# Patient Record
Sex: FEMALE | Race: WHITE | NOT HISPANIC OR LATINO | Employment: OTHER | URBAN - METROPOLITAN AREA
[De-identification: names, ages, dates, MRNs, and addresses within clinical notes are randomized per-mention and may not be internally consistent; named-entity substitution may affect disease eponyms.]

---

## 2021-11-05 ENCOUNTER — TELEPHONE (OUTPATIENT)
Dept: INTERNAL MEDICINE | Age: 66
End: 2021-11-05

## 2021-11-05 NOTE — TELEPHONE ENCOUNTER
Caller: Kim Phillips    Relationship to patient: Self    Best call back number: [phone]    Patient is needing: PATIENT IS CALLING REQUESTING SAMPLES OF ELIQUIS. PATIENT IS ALMOST OUT OF CURRENT SAMPLES. PATIENT HAS A 4 DAY SUPPLY

## 2022-01-14 ENCOUNTER — TELEPHONE (OUTPATIENT)
Dept: INTERNAL MEDICINE | Age: 67
End: 2022-01-14

## 2022-01-14 NOTE — TELEPHONE ENCOUNTER
Caller: Noel Phillips    Relationship: Emergency Contact    Best call back number: [phone]  What is the medical concern/diagnosis: REFERRAL FOR AN EPIDURAL BEFORE THEY LEAVE FOR THE DRIVE DOWN TO [place].  PATIENT HAS AN APPOINTMENT WITH [facility] WILL THEY BE ABLE TO HELP WITH THIS EPIDURAL?        Any additional details: PLEASE CONTACT PATIENT'S .

## 2022-01-14 NOTE — TELEPHONE ENCOUNTER
Spoke with patient and advised them to call [facility] to ask them if they can bump up patients appointment and if they will give her an epidural. We can place an order for an Epidural. Pt verbalized understanding.

## 2022-04-12 PROBLEM — M51.369 DEGENERATION OF LUMBAR INTERVERTEBRAL DISC: Status: ACTIVE | Noted: 2022-04-12

## 2023-11-05 ENCOUNTER — HOSPITAL ENCOUNTER (OUTPATIENT)
Facility: HOSPITAL | Age: 68
Discharge: HOME | End: 2023-11-05
Admitting: INTERNAL MEDICINE
Payer: MEDICARE

## 2023-11-05 DIAGNOSIS — R93.89 ABNORMAL CT OF THE CHEST: ICD-10-CM

## 2023-11-05 PROCEDURE — 71250 CT THORAX DX C-: CPT

## 2023-11-07 DIAGNOSIS — E78.5 HYPERLIPIDEMIA, UNSPECIFIED HYPERLIPIDEMIA TYPE: Primary | ICD-10-CM

## 2023-11-07 RX ORDER — EZETIMIBE 10 MG/1
10 TABLET ORAL DAILY
Qty: 90 TABLET | Refills: 3 | Status: SHIPPED | OUTPATIENT
Start: 2023-11-07

## 2023-11-08 ENCOUNTER — TRANSCRIBE ORDERS (OUTPATIENT)
Dept: ADMINISTRATIVE | Facility: HOSPITAL | Age: 68
End: 2023-11-08
Payer: MEDICARE

## 2023-11-08 DIAGNOSIS — R93.89 ABNORMAL CHEST CT: Primary | ICD-10-CM

## 2023-12-12 DIAGNOSIS — M54.42 ACUTE MIDLINE LOW BACK PAIN WITH BILATERAL SCIATICA: ICD-10-CM

## 2023-12-12 DIAGNOSIS — M54.41 ACUTE MIDLINE LOW BACK PAIN WITH BILATERAL SCIATICA: ICD-10-CM

## 2023-12-12 RX ORDER — GABAPENTIN 300 MG/1
CAPSULE ORAL
Qty: 90 CAPSULE | Refills: 0 | Status: SHIPPED | OUTPATIENT
Start: 2023-12-12

## 2023-12-24 DIAGNOSIS — M54.41 ACUTE MIDLINE LOW BACK PAIN WITH BILATERAL SCIATICA: ICD-10-CM

## 2023-12-24 DIAGNOSIS — M54.42 ACUTE MIDLINE LOW BACK PAIN WITH BILATERAL SCIATICA: ICD-10-CM

## 2023-12-26 RX ORDER — GABAPENTIN 300 MG/1
CAPSULE ORAL
Qty: 90 CAPSULE | Refills: 0 | OUTPATIENT
Start: 2023-12-26

## 2024-01-02 ENCOUNTER — TELEPHONE (OUTPATIENT)
Dept: INTERNAL MEDICINE | Age: 69
End: 2024-01-02
Payer: MEDICARE

## 2024-01-02 DIAGNOSIS — M54.41 ACUTE MIDLINE LOW BACK PAIN WITH BILATERAL SCIATICA: ICD-10-CM

## 2024-01-02 DIAGNOSIS — M54.42 ACUTE MIDLINE LOW BACK PAIN WITH BILATERAL SCIATICA: ICD-10-CM

## 2024-01-02 RX ORDER — GABAPENTIN 300 MG/1
CAPSULE ORAL
Qty: 90 CAPSULE | Refills: 0 | Status: SHIPPED | OUTPATIENT
Start: 2024-01-02

## 2024-01-02 NOTE — TELEPHONE ENCOUNTER
Caller: Kim Nava    Relationship to patient: Self    Best call back number: [phone]    Patient is needing:     PATIENT IS LEAVING TO GO OUT OF TOWN ON 1.5.23 FOR A COUPLE MONTHS AND WOULD LIKE TO  A REFILL BEFORE SHE LEAVES.     WOULD LIKE TO KNOW IF JACK DE LA TORRE CAN OK THIS TO BE FILLED EARLIER FOR HER:       gabapentin (NEURONTIN) 300 MG capsule     PHARMACY:     [facility] [id number] - [place] [address] AT [facility] [address] - [phone] [facility] [phone] FX

## 2024-01-02 NOTE — TELEPHONE ENCOUNTER
UDS- 10/25/2023  CONTRACT- 10/25/2023    LOV- 10/25/2023  NOV- 5/22/2024    PATIENT IS LEAVING TO GO OUT OF TOWN ON 1.5.23 FOR A COUPLE MONTHS AND WOULD LIKE TO  A REFILL BEFORE SHE LEAVES.

## 2024-01-03 RX ORDER — TRAZODONE HYDROCHLORIDE 50 MG/1
TABLET ORAL
Qty: 90 TABLET | Refills: 1 | Status: SHIPPED | OUTPATIENT
Start: 2024-01-03

## 2024-01-04 ENCOUNTER — TELEPHONE (OUTPATIENT)
Dept: INTERNAL MEDICINE | Age: 69
End: 2024-01-04
Payer: MEDICARE

## 2024-01-04 NOTE — TELEPHONE ENCOUNTER
Pt called office, her pharmacy would not let her pick her medication up early due to it being controlled, provider gave ok for early  fuentes pt is going out of town, I call pharmacy to let them know, they have released the rx and pt informed.

## 2024-01-05 ENCOUNTER — TELEPHONE (OUTPATIENT)
Dept: INTERNAL MEDICINE | Age: 69
End: 2024-01-05

## 2024-01-05 DIAGNOSIS — H10.9 CONJUNCTIVITIS, UNSPECIFIED CONJUNCTIVITIS TYPE, UNSPECIFIED LATERALITY: Primary | ICD-10-CM

## 2024-01-05 RX ORDER — POLYMYXIN B SULFATE AND TRIMETHOPRIM 1; 10000 MG/ML; [USP'U]/ML
1 SOLUTION OPHTHALMIC EVERY 4 HOURS
Qty: 10 ML | Refills: 0 | Status: SHIPPED | OUTPATIENT
Start: 2024-01-05

## 2024-01-05 NOTE — TELEPHONE ENCOUNTER
Pt called requesting same day appt. Advised nothing available today. Pt said she was leaving for vacation and wanted to know if pierce has recommendation on over the counter cream she could get. Her right eye is swollen and crusty. Call back number [phone]

## 2024-01-08 NOTE — TELEPHONE ENCOUNTER
Called pt she said she can't get the ointment until she returns from vacation, she had already left when it was called in. She said her eye is feeling slightly better but will see UC if it worsens again.   Applied

## 2024-03-19 DIAGNOSIS — M54.41 ACUTE MIDLINE LOW BACK PAIN WITH BILATERAL SCIATICA: ICD-10-CM

## 2024-03-19 DIAGNOSIS — M54.42 ACUTE MIDLINE LOW BACK PAIN WITH BILATERAL SCIATICA: ICD-10-CM

## 2024-03-20 RX ORDER — GABAPENTIN 300 MG/1
CAPSULE ORAL
Qty: 90 CAPSULE | Refills: 1 | Status: SHIPPED | OUTPATIENT
Start: 2024-03-20

## 2024-05-22 ENCOUNTER — OFFICE VISIT (OUTPATIENT)
Dept: INTERNAL MEDICINE | Age: 69
End: 2024-05-22
Payer: MEDICARE

## 2024-05-22 VITALS
WEIGHT: 173.4 LBS | TEMPERATURE: 98.3 F | DIASTOLIC BLOOD PRESSURE: 76 MMHG | SYSTOLIC BLOOD PRESSURE: 122 MMHG | OXYGEN SATURATION: 100 % | HEIGHT: 62 IN | BODY MASS INDEX: 31.91 KG/M2 | HEART RATE: 65 BPM

## 2024-05-22 DIAGNOSIS — M51.36 DEGENERATION OF LUMBAR INTERVERTEBRAL DISC: ICD-10-CM

## 2024-05-22 DIAGNOSIS — Z79.899 HIGH RISK MEDICATION USE: ICD-10-CM

## 2024-05-22 DIAGNOSIS — M54.50 LOW BACK PAIN WITHOUT SCIATICA, UNSPECIFIED BACK PAIN LATERALITY, UNSPECIFIED CHRONICITY: ICD-10-CM

## 2024-05-22 DIAGNOSIS — M43.10 DEGENERATIVE SPONDYLOLISTHESIS: ICD-10-CM

## 2024-05-22 DIAGNOSIS — R73.01 IMPAIRED FASTING GLUCOSE: ICD-10-CM

## 2024-05-22 DIAGNOSIS — E78.5 HYPERLIPIDEMIA, UNSPECIFIED HYPERLIPIDEMIA TYPE: ICD-10-CM

## 2024-05-22 DIAGNOSIS — Z12.31 ENCOUNTER FOR SCREENING MAMMOGRAM FOR MALIGNANT NEOPLASM OF BREAST: ICD-10-CM

## 2024-05-22 DIAGNOSIS — Z00.00 MEDICARE ANNUAL WELLNESS VISIT, SUBSEQUENT: Primary | ICD-10-CM

## 2024-05-22 DIAGNOSIS — Z78.0 POSTMENOPAUSAL: ICD-10-CM

## 2024-05-22 DIAGNOSIS — E55.9 VITAMIN D DEFICIENCY: ICD-10-CM

## 2024-05-22 RX ORDER — MELATONIN
2000 DAILY
COMMUNITY

## 2024-05-22 RX ORDER — AMOXICILLIN 500 MG/1
500 CAPSULE ORAL 2 TIMES DAILY
Qty: 30 CAPSULE | Refills: 0 | Status: SHIPPED | OUTPATIENT
Start: 2024-05-22

## 2024-05-22 RX ORDER — ASPIRIN 81 MG/1
81 TABLET ORAL DAILY
COMMUNITY

## 2024-05-22 NOTE — PROGRESS NOTES
I N T E R N A L  M E D I C I N E  JACK DE LA TORRE, APRN      ENCOUNTER DATE:  05/22/2024    Kim Nava / 68 y.o. / female    MEDICARE ANNUAL WELLNESS VISIT       Chief Complaint: Medicare Wellness-subsequent, Hyperlipidemia, Insomnia, and Back Pain       Patient's general assessment of her health since a year ago:     - Compared to one year ago, she feels her physical health is about the same without significant change.    - Compared to one year ago, she feels her mental health is about the same without significant change.      HPI for other active medical problems:     Eye exam with Dr. Maury Bernard December 15, 2023 with findings of cataract.    History of acute deep vein thrombosis DVT of axillary vein of right upper extremity last seen on December 7, 2021.  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  At that time she was referred to vascular surgery.  Has now been taken off of Eliquis.  Has restarted her baby aspirin.     Hyperlipidemia: Currently on crestor 40mg daily.  Last LDL of 129, triglycerides 230, total cholesterol 235.  On rosuvastatin 40 mg.  Reinitiated Zetia with .  LDL now down to 117.      Impaired fasting glucose: Last hemoglobin A1c 5.9 in prediabetic range.  No medication treatment at this time.    Vitamin D level 23.7 on 5000 IU daily OTC, at last office visit was switched to 50,000 units to prescription supplement on a weekly basis.  She takes that when she remembers.     Lumbar radiculopathy: Has been on gabapentin 300 mg every 8 hours.  Last MRI of the lumbar and thoracic spine completed in June and July 2021.  MRI showed bulging disc at L2-L3 along with severe stenosis at L3-L4 with bulging disc.  Thoracic spine showed evidence of spinal cord compression versus artifact. S/P lumbar fusion with significant  "improvement in symptoms now controlled with continued gabapentin.      Mammogram benign 11/2022. DEXA osteopenia 05/2022. Cologuard neg 04/23/2022. No longer receiving pap smears, but family history of ovarian cancer, in [facility] study yearly for screening, has postponed due to scheduling.     Insomnia controlled with trazodone 50 mg nightly.     Bronchoscopy performed August 8, 2023 with positive finding of saprophytic mold isolated.  Planned additional biopsy.  Now has routine follow-up with pulmonology in which she has updated CT of the chest scheduled for June.    Diagnosis of rosacea is now following dermatology.    * The required components of Health Risk Assessment (HRA) that were completed by the patient and/or my staff are contained within this note and in the scanned documents titled \"Health Risk Assessment\" within the media section of the patient's chart in [facility].         HISTORY       Recent Hospitalizations:    Recent hospitalization?: No     If YES, location, date, and diagnoses:     Location:   Date:   Principle Discharge Dx:   Secondary Dx:       Patient Care Team:    Patient Care Team:  Jono Eng, OSMEL, APRN as PCP - General (Internal Medicine)  Noel Saenz MD as Surgeon (Orthopedic Surgery)  Maury Bernard OD (Optometry)  Arya Mccann MD as Consulting Physician (Pulmonary Disease)  Ellen Becker MD as Consulting Physician (Dermatology)      Allergies:  Cefazolin, Cephalosporins, Etomidate, Propofol, Metal, Codeine, Copper chloride, and Kiwi extract    Medications:  Current Outpatient Medications on File Prior to Visit   Medication Sig Dispense Refill    ezetimibe (Zetia) 10 MG tablet Take 1 tablet by mouth Daily. 90 tablet 3    gabapentin (NEURONTIN) 300 MG capsule TAKE 1 CAPSULE BY MOUTH EVERY MORNING AND TAKE TWO CAPSULES BY MOUTH EVERY EVENING 90 capsule 1    MULTIPLE VITAMINS-MINERALS PO Take 1 tablet by mouth Daily.      rosuvastatin (CRESTOR) 40 MG tablet TAKE ONE TABLET BY MOUTH " DAILY (Patient taking differently: Take 1 tablet by mouth Every Night.) 90 tablet 3    traZODone (DESYREL) 50 MG tablet TAKE ONE TABLET BY MOUTH ONCE NIGHTLY 90 tablet 1    trimethoprim-polymyxin b (Polytrim) 38136-6.1 UNIT/ML-% ophthalmic solution Apply 1 drop to eye(s) as directed by provider Every 4 (Four) Hours. 10 mL 0    vitamin D (ERGOCALCIFEROL) 1.25 MG (81520 UT) capsule capsule Take 1 capsule by mouth 1 (One) Time Per Week. 12 capsule 3    aspirin 81 MG EC tablet Take 1 tablet by mouth Daily.      Cholecalciferol 25 MCG (1000 UT) tablet Take 2 tablets by mouth Daily.       No current facility-administered medications on file prior to visit.        No opioid medication identified on active medication list. I have reviewed chart for other potential  high risk medication/s and harmful drug interactions in the elderly.          PFSH:     The following portions of the patient's history were reviewed and updated as appropriate: Allergies / Current Medications / Past Medical History / Surgical History / Social History / Family History    Problem List:  Patient Active Problem List   Diagnosis    Hyperlipidemia    Osteoarthritis of both knees    Impaired fasting glucose    Rash    Disorder of sacroiliac joint    Spondylolisthesis of lumbar region    Spondylolisthesis of lumbar region    Lumbar spondylosis    Low back pain    Acute renal insufficiency    Acute deep vein thrombosis (DVT) of axillary vein of right upper extremity    Acute deep vein thrombosis (DVT) of brachial vein of right upper extremity    Cellulitis of right upper limb    Arthritis    Chronic back pain greater than 3 months duration    Constipation    Degeneration of lumbar intervertebral disc    History of total left knee replacement    History of vaccination    Knee pain    Lumbosacral spondylosis without myelopathy    Neuralgia    Acute embolism and thrombosis of deep veins of right upper extremity    Body mass index (BMI) 32.0-32.9, adult     "Obesity, unspecified    Personal history of nicotine dependence    Presence of artificial knee joint, bilateral    Personal history of sudden cardiac arrest    Degenerative spondylolisthesis    S/P lumbar fusion    Spinal stenosis of lumbar region with neurogenic claudication       Past Medical History:  Past Medical History:   Diagnosis Date    Acute renal failure     PATIENT STATES\" AFTER I  ON THE TABLE I HAD SOME KIDNEY ISSUES BUT NOTHING NOW.\"    Arthritis     Cardiac arrest     DURING SURGERY, PATIENT STATES IT WAS FROM \[facility]\""    Constipation     Hyperlipidemia     Insomnia     Lung anomaly     CRACKED GLASS SYNDROME    PONV (postoperative nausea and vomiting)        Past Surgical History:  Past Surgical History:   Procedure Laterality Date    BACK SURGERY Bilateral 2022    BRONCHOSCOPY N/A 2023    Procedure: BRONCHOSCOPY with brushing and bal;  Surgeon: Arya Mccann MD;  [facility] ENDOSCOPY;  Service: Pulmonary;  Laterality: N/A;  pre- abnormal ct   psot-     SECTION      JOINT REPLACEMENT Right 2020    [facility]     JOINT REPLACEMENT Left 2020    MEDIAL BRANCH BLOCK Bilateral 2021    Procedure: MEDIAL BRANCH BLOCK--bilateral lumbar2-lumbar4;  Surgeon: Risa Rivas MD;  Location: [facility] MAIN OR;  Service: Pain Management;  Laterality: Bilateral;    SACROILIAC JOINT INJECTION Right 2021    Procedure: SACROILIAC INJECTION;  Surgeon: Risa Rivas MD;  Location: [facility] MAIN OR;  Service: Pain Management;  Laterality: Right;       Social History:  Social History     Socioeconomic History    Marital status:    Tobacco Use    Smoking status: Former     Current packs/day: 0.00     Average packs/day: 0.5 packs/day for 30.0 years (15.0 ttl pk-yrs)     Types: Cigarettes     Start date:      Quit date: 2008     Years since quittin.4    Smokeless tobacco: Never   Vaping Use    Vaping status: Never Used   Substance and Sexual " "Activity    Alcohol use: Yes     Alcohol/week: 3.0 standard drinks of alcohol     Types: 3 Glasses of wine per week    Drug use: Yes     Types: Marijuana     Comment: OCCASSIONALLY    Sexual activity: Not Currently     Partners: Male       Family History:  Family History   Problem Relation Age of Onset    Ovarian cancer Mother     Breast cancer Neg Hx     Malig Hyperthermia Neg Hx          PATIENT ASSESSMENT     Vitals:  /76 (BP Location: Left arm, Patient Position: Sitting, Cuff Size: Adult)   Pulse 65   Temp 98.3 °F (36.8 °C) (Temporal)   Ht 157.5 cm (62\")   Wt 78.7 kg (173 lb 6.4 oz)   SpO2 100%   BMI 31.72 kg/m²   BP Readings from Last 3 Encounters:   05/22/24 122/76   10/25/23 122/70   08/08/23 147/88     Wt Readings from Last 3 Encounters:   05/22/24 78.7 kg (173 lb 6.4 oz)   10/25/23 79.9 kg (176 lb 3.2 oz)   08/08/23 77.1 kg (170 lb)      Body mass index is 31.72 kg/m².    Pain Score    05/22/24 0932   PainSc: 0-No pain         Review of Systems:    Review of Systems  Constitutional neg except per HPI   Resp neg  CV neg   Musc chronic back pain      Physical Exam:    Physical Exam  Constitutional  No distress  Cardiovascular Rate  normal . Rhythm: regular . Heart sounds:  normal  Pulmonary/Chest  Effort normal. Breath sounds:  normal  Psychiatric  Alert. Judgment and thought content normal. Mood normal       Reviewed Data:    Labs:   Lab Results   Component Value Date     10/25/2023    K 4.6 10/25/2023    CALCIUM 9.8 10/25/2023    AST 18 10/25/2023    ALT 14 10/25/2023    BUN 15 10/25/2023    CREATININE 0.88 10/25/2023    CREATININE 0.89 04/24/2023    CREATININE 0.94 10/14/2022    EGFRIFNONA 64 12/07/2021    EGFRIFAFRI >60 07/12/2022       Lab Results   Component Value Date     07/12/2022    GLU 94 06/30/2022    HGBA1C 5.9 (H) 10/25/2023    HGBA1C 5.9 (H) 04/24/2023    HGBA1C 5.90 (H) 10/14/2022    MICROALBUR <1.2 11/10/2021       Lab Results   Component Value Date     (H) " "10/25/2023     (H) 04/24/2023     (H) 10/14/2022    HDL 69 10/25/2023    TRIG 170 (H) 10/25/2023    CHOLHDLRATIO 3.1 10/25/2023       Lab Results   Component Value Date    TSH 1.170 04/12/2022    FREET4 1.46 04/12/2022          Lab Results   Component Value Date    WBC 6.3 04/24/2023    HGB 14.4 04/24/2023    HGB 7.9 (L) 07/12/2022    HGB 11.7 (L) 07/11/2022     04/24/2023                 No results found for: \"PSA\"    Imaging:          Medical Tests:          Screening for Glaucoma:  Previous screening for glaucoma?: Yes      Hearing Loss Screen:  Finger Rub Hearing Test (right ear): passed  Finger Rub Hearing Test (left ear): passed      Urinary Incontinence Screen:  Episodes of urinary incontinence? : No      Depression Screen:      5/22/2024     9:32 AM   PHQ-2/PHQ-9 Depression Screening   Little Interest or Pleasure in Doing Things 0-->not at all   Feeling Down, Depressed or Hopeless 0-->not at all   PHQ-9: Brief Depression Severity Measure Score 0        PHQ-2: 0 (Not depressed)    PHQ-9: 0 (Negative screening for depression)       FUNCTIONAL, FALL RISK, & COGNITIVE SCREENING (Components below):    DATA:        5/22/2024     9:30 AM   Functional & Cognitive Status   Do you have difficulty preparing food and eating? No   Do you have difficulty bathing yourself, getting dressed or grooming yourself? No   Do you have difficulty using the toilet? No   Do you have difficulty moving around from place to place? No   Do you have trouble with steps or getting out of a bed or a chair? No   Current Diet Well Balanced Diet   Dental Exam Not up to date   Eye Exam Up to date   Exercise (times per week) 3 times per week   Current Exercises Include Walking;Stationary Bicycling/Spin Class   Do you need help using the phone?  No   Are you deaf or do you have serious difficulty hearing?  No   Do you need help to go to places out of walking distance? No   Do you need help shopping? No   Do you need help " preparing meals?  No   Do you need help with housework?  No   Do you need help with laundry? No   Do you need help taking your medications? No   Do you need help managing money? No   Do you ever drive or ride in a car without wearing a seat belt? No   Have you felt unusual stress, anger or loneliness in the last month? No   Who do you live with? Spouse   If you need help, do you have trouble finding someone available to you? No   Have you been bothered in the last four weeks by sexual problems? No   Do you have difficulty concentrating, remembering or making decisions? No         A) Assessment of Functional Ability:  (Assessment of ability to perform ADL's (showering/bathing, using toilet, dressing, feeding self, moving self around) and IADL's (use telephone, shop, prepare food, housekeep, do laundry, transport independently, take medications independently, and handle finances)    Degree of functional impairment: NONE (based on assessment noted above)      B) Assessment of Fall Risk:  Fall Risk Assessment was completed, and patient is at low risk for falls.       Need for further evaluation of gait, strength, and balance? : No    Timed Up and Go (TUG):   (>= 12 seconds indicates high risk for falling)    Observable abnormalities included: Normal gait pattern       C. Assessment of Cognitive Function:    Mini-Cog Test:     1) Registration (3 objects): Yes   2) Number of objects recalled: 3   3) Clock Draw: Passed? : Yes       Further evaluation required? : No        COUNSELING       A. Identification of Health Risk Factors:    Risk factors include: cardiovascular risk factors      B. Age-Appropriate Screening Schedule:  (Refer to the list below for future screening recommendations based on patient's age, sex and/or medical conditions. Orders for these recommended tests are listed in the plan section. The patient has been provided with a written plan)    Health Maintenance Topics  Health Maintenance   Topic Date Due     ZOSTER VACCINE (1 of 2) Never done    RSV Vaccine - Adults (1 - 1-dose 60+ series) Never done    BMI FOLLOWUP  10/19/2022    COVID-19 Vaccine (4 - 2023-24 season) 09/01/2023    DXA SCAN  05/02/2024    TDAP/TD VACCINES (1 - Tdap) 05/22/2025 (Originally 8/6/1974)    INFLUENZA VACCINE  08/01/2024    LIPID PANEL  10/25/2024    MAMMOGRAM  11/18/2024    COLORECTAL CANCER SCREENING  04/23/2025    ANNUAL WELLNESS VISIT  05/22/2025    HEPATITIS C SCREENING  Completed    Pneumococcal Vaccine 65+  Completed    PAP SMEAR  Discontinued       Health Maintenance Topics Due or Over-Due  Health Maintenance Due   Topic Date Due    ZOSTER VACCINE (1 of 2) Never done    RSV Vaccine - Adults (1 - 1-dose 60+ series) Never done    BMI FOLLOWUP  10/19/2022    COVID-19 Vaccine (4 - 2023-24 season) 09/01/2023    DXA SCAN  05/02/2024     C. Advanced Care Planning:    Advance Care Planning   ACP discussion was held with the patient during this visit. Patient does not have an advance directive, declines further assistance.       D. Patient Self-Management and Personalized Health Advice:    She has been provided with personalized counseling/information (including brochures/handouts) about:     -- optimizing diet/nutrition plans, improving exercise / conditioning, and reducing risk for cardiovascular disease (heart, stroke, vascular)      She has been recommended for the following preventative services which has been performed today, will be ordered today or ordered/performed on upcoming follow-up visit:     -- SMOKING cessation counseling completed, ALCOHOL use counseling completed, NUTRITION counseling provided, EXERCISE counseling provided, OSTEOPOROSIS screening DISCUSSED, BREAST CANCER screening DISCUSSED, **COLORECTAL cancer screening (colonoscopy/Cologuard discussed or ordered)      E. Miscellaneous Items:    -Aspirin use counseling: Taking ASA appropriately as indicated    -Discussed BMI with her. The BMI is above average; BMI  management plan is completed (discussed plans for weight loss)    -Reviewed use of high risk medication in the elderly: YES    -Reviewed for potential of harmful drug interactions in the elderly: YES    WRAP UP       Assessment & Plan:    1) MEDICARE ANNUAL WELLNESS VISIT    2) OTHER MEDICAL CONDITIONS ADDRESSED TODAY:    Problem List Items Addressed This Visit       Hyperlipidemia    Overview     Formatting of this note might be different from the original.  Description: (PL)  Formatting of this note might be different from the original.  Description: (PL)         Relevant Medications    rosuvastatin (CRESTOR) 40 MG tablet    ezetimibe (Zetia) 10 MG tablet    Other Relevant Orders    Lipid Panel With / Chol / HDL Ratio    Comprehensive Metabolic Panel    CBC w AUTO Differential    Impaired fasting glucose    Relevant Orders    Hemoglobin A1c    CBC w AUTO Differential    Low back pain    Relevant Medications    gabapentin (NEURONTIN) 300 MG capsule    Other Relevant Orders    Drug Screen 11 w/Conf, Serum    DEXA Bone Density Axial    Degeneration of lumbar intervertebral disc    Relevant Orders    Drug Screen 11 w/Conf, Serum    DEXA Bone Density Axial    Degenerative spondylolisthesis    Relevant Orders    Drug Screen 11 w/Conf, Serum    DEXA Bone Density Axial     Other Visit Diagnoses       Medicare annual wellness visit, subsequent    -  Primary    Encounter for screening mammogram for malignant neoplasm of breast        Relevant Orders    Mammo screening digital tomosynthesis bilateral w CAD    Postmenopausal        Relevant Orders    DEXA Bone Density Axial    Vitamin D deficiency        Relevant Orders    Vitamin D,25-Hydroxy    High risk medication use              Orders Placed This Encounter   Procedures    Mammo screening digital tomosynthesis bilateral w CAD    DEXA Bone Density Axial    Lipid Panel With / Chol / HDL Ratio    Comprehensive Metabolic Panel    Hemoglobin A1c    Drug Screen 11 w/Conf,  Serum    Vitamin D,25-Hydroxy    CBC w AUTO Differential     Discussion / Summary:  Controlled substance contract signed today.  Drug screen ordered and Jean-Pierre reviewed for gabapentin  Order for DEXA scan and mammogram for screening, up-to-date on colon cancer screening  Continue all current chronic medications and specialty follow-up    Medications as of TODAY:              Current Outpatient Medications   Medication Sig Dispense Refill    ezetimibe (Zetia) 10 MG tablet Take 1 tablet by mouth Daily. 90 tablet 3    gabapentin (NEURONTIN) 300 MG capsule TAKE 1 CAPSULE BY MOUTH EVERY MORNING AND TAKE TWO CAPSULES BY MOUTH EVERY EVENING 90 capsule 1    MULTIPLE VITAMINS-MINERALS PO Take 1 tablet by mouth Daily.      rosuvastatin (CRESTOR) 40 MG tablet TAKE ONE TABLET BY MOUTH DAILY (Patient taking differently: Take 1 tablet by mouth Every Night.) 90 tablet 3    traZODone (DESYREL) 50 MG tablet TAKE ONE TABLET BY MOUTH ONCE NIGHTLY 90 tablet 1    trimethoprim-polymyxin b (Polytrim) 39280-3.1 UNIT/ML-% ophthalmic solution Apply 1 drop to eye(s) as directed by provider Every 4 (Four) Hours. 10 mL 0    vitamin D (ERGOCALCIFEROL) 1.25 MG (43599 UT) capsule capsule Take 1 capsule by mouth 1 (One) Time Per Week. 12 capsule 3    amoxicillin (AMOXIL) 500 MG capsule Take 1 capsule by mouth 2 (Two) Times a Day. 30 capsule 0    aspirin 81 MG EC tablet Take 1 tablet by mouth Daily.      Cholecalciferol 25 MCG (1000 UT) tablet Take 2 tablets by mouth Daily.       No current facility-administered medications for this visit.     FOLLOW-UP:            Return in about 6 months (around 11/22/2024) for Next scheduled follow up; 1 year medicare wellness .                 Future Appointments   Date Time Provider Department Center   6/2/2024 11:00 AM [facility] 1 [facility] None   11/22/2024 10:00 AM Jono Eng DNP, [facility]   5/29/2025  9:30 AM Jono Eng DNP, APRN [facility]  [facility]       After Visit Summary (AVS)  including the Personalized Prevention  Plan Services (PPPS) was either printed and given to the patient at check-out today and/or sent to [employer] for review.     *Dragon dictation used for documentation.

## 2024-05-30 LAB
25(OH)D3+25(OH)D2 SERPL-MCNC: 49.3 NG/ML (ref 30–100)
ALBUMIN SERPL-MCNC: 4.6 G/DL (ref 3.9–4.9)
ALBUMIN/GLOB SERPL: 1.8 {RATIO} (ref 1.2–2.2)
ALP SERPL-CCNC: 73 IU/L (ref 44–121)
ALT SERPL-CCNC: 16 IU/L (ref 0–32)
AMPHETAMINES SERPL QL SCN: NEGATIVE NG/ML
AST SERPL-CCNC: 20 IU/L (ref 0–40)
BARBITURATES SERPL QL SCN: NEGATIVE UG/ML
BASOPHILS # BLD AUTO: 0.1 X10E3/UL (ref 0–0.2)
BASOPHILS NFR BLD AUTO: 1 %
BENZODIAZ SERPL QL SCN: NEGATIVE NG/ML
BILIRUB SERPL-MCNC: 0.3 MG/DL (ref 0–1.2)
BUN SERPL-MCNC: 15 MG/DL (ref 8–27)
BUN/CREAT SERPL: 16 (ref 12–28)
CALCIUM SERPL-MCNC: 9.6 MG/DL (ref 8.7–10.3)
CANNABINOIDS SERPL QL SCN: NEGATIVE NG/ML
CHLORIDE SERPL-SCNC: 105 MMOL/L (ref 96–106)
CHOLEST SERPL-MCNC: 171 MG/DL (ref 100–199)
CHOLEST/HDLC SERPL: 2.6 RATIO (ref 0–4.4)
CO2 SERPL-SCNC: 20 MMOL/L (ref 20–29)
COCAINE+BZE SERPL QL SCN: NEGATIVE NG/ML
CREAT SERPL-MCNC: 0.94 MG/DL (ref 0.57–1)
EGFRCR SERPLBLD CKD-EPI 2021: 66 ML/MIN/1.73
EOSINOPHIL # BLD AUTO: 0.3 X10E3/UL (ref 0–0.4)
EOSINOPHIL NFR BLD AUTO: 5 %
ERYTHROCYTE [DISTWIDTH] IN BLOOD BY AUTOMATED COUNT: 12.5 % (ref 11.7–15.4)
ETHANOL SERPL-MCNC: NEGATIVE GM/DL
GLOBULIN SER CALC-MCNC: 2.5 G/DL (ref 1.5–4.5)
GLUCOSE SERPL-MCNC: 92 MG/DL (ref 70–99)
HBA1C MFR BLD: 6.1 % (ref 4.8–5.6)
HCT VFR BLD AUTO: 43 % (ref 34–46.6)
HDLC SERPL-MCNC: 65 MG/DL
HGB BLD-MCNC: 14.2 G/DL (ref 11.1–15.9)
IMM GRANULOCYTES # BLD AUTO: 0 X10E3/UL (ref 0–0.1)
IMM GRANULOCYTES NFR BLD AUTO: 0 %
LDLC SERPL CALC-MCNC: 81 MG/DL (ref 0–99)
LYMPHOCYTES # BLD AUTO: 2.7 X10E3/UL (ref 0.7–3.1)
LYMPHOCYTES NFR BLD AUTO: 41 %
MCH RBC QN AUTO: 30.5 PG (ref 26.6–33)
MCHC RBC AUTO-ENTMCNC: 33 G/DL (ref 31.5–35.7)
MCV RBC AUTO: 92 FL (ref 79–97)
METHADONE SERPL QL SCN: NEGATIVE NG/ML
MONOCYTES # BLD AUTO: 0.6 X10E3/UL (ref 0.1–0.9)
MONOCYTES NFR BLD AUTO: 9 %
NEUTROPHILS # BLD AUTO: 3 X10E3/UL (ref 1.4–7)
NEUTROPHILS NFR BLD AUTO: 44 %
OPIATES SERPL QL SCN: NEGATIVE NG/ML
OXYCODONE+OXYMORPHONE SERPLBLD QL SCN: NEGATIVE NG/ML
PCP SERPL QL SCN: NEGATIVE NG/ML
PLATELET # BLD AUTO: 290 X10E3/UL (ref 150–450)
POTASSIUM SERPL-SCNC: 4.8 MMOL/L (ref 3.5–5.2)
PROPOXYPH SERPL QL SCN: NEGATIVE NG/ML
PROT SERPL-MCNC: 7.1 G/DL (ref 6–8.5)
RBC # BLD AUTO: 4.66 X10E6/UL (ref 3.77–5.28)
SODIUM SERPL-SCNC: 141 MMOL/L (ref 134–144)
TRIGL SERPL-MCNC: 147 MG/DL (ref 0–149)
VLDLC SERPL CALC-MCNC: 25 MG/DL (ref 5–40)
WBC # BLD AUTO: 6.6 X10E3/UL (ref 3.4–10.8)

## 2024-06-02 RX ORDER — ROSUVASTATIN CALCIUM 40 MG/1
40 TABLET, COATED ORAL NIGHTLY
Qty: 90 TABLET | Refills: 1 | Status: SHIPPED | OUTPATIENT
Start: 2024-06-02

## 2024-06-04 ENCOUNTER — HOSPITAL ENCOUNTER (OUTPATIENT)
Dept: PET IMAGING | Facility: HOSPITAL | Age: 69
Discharge: HOME | End: 2024-06-04
Payer: MEDICARE

## 2024-06-04 DIAGNOSIS — R93.89 ABNORMAL CHEST CT: ICD-10-CM

## 2024-06-04 PROCEDURE — 71250 CT THORAX DX C-: CPT

## 2024-07-02 DIAGNOSIS — M54.41 ACUTE MIDLINE LOW BACK PAIN WITH BILATERAL SCIATICA: ICD-10-CM

## 2024-07-02 DIAGNOSIS — M54.42 ACUTE MIDLINE LOW BACK PAIN WITH BILATERAL SCIATICA: ICD-10-CM

## 2024-07-03 RX ORDER — GABAPENTIN 300 MG/1
CAPSULE ORAL
Qty: 90 CAPSULE | Refills: 1 | Status: SHIPPED | OUTPATIENT
Start: 2024-07-03

## 2024-08-28 RX ORDER — TRAZODONE HYDROCHLORIDE 50 MG/1
TABLET, FILM COATED ORAL
Qty: 90 TABLET | Refills: 1 | Status: SHIPPED | OUTPATIENT
Start: 2024-08-28

## 2024-09-04 RX ORDER — TRAZODONE HYDROCHLORIDE 50 MG/1
50 TABLET, FILM COATED ORAL NIGHTLY
Qty: 90 TABLET | Refills: 1 | Status: SHIPPED | OUTPATIENT
Start: 2024-09-04

## 2024-09-04 NOTE — TELEPHONE ENCOUNTER
Caller: Noel Phillips    Relationship: Emergency Contact    Best call back number: [phone]     Requested Prescriptions:   Requested Prescriptions     Pending Prescriptions Disp Refills    traZODone (DESYREL) 50 MG tablet 90 tablet 1     Sig: Take 1 tablet by mouth Every Night.        Pharmacy where request should be sent: [facility] [id number] [place], [place] - [facility] [address]  AT [facility]  - [phone] [facility] [phone] FX     Last office visit with prescribing clinician: 5/22/2024   Last telemedicine visit with prescribing clinician: Visit date not found   Next office visit with prescribing clinician: 11/22/2024     Additional details provided by patient: PATIENT WAS TOLD THAT JACK DE LA TORRE DECLINED THE REFILL ON THIS    PATIENT IS NEEDING AN EARLY REFILL DUE TO HER LEAVING FOR VACATION ON MONDAY     Does the patient have less than a 3 day supply:  [] Yes  [x] No    Would you like a call back once the refill request has been completed: [] Yes [x] No    If the office needs to give you a call back, can they leave a voicemail: [] Yes [x] No    Neva Marin Rep   09/04/24 09:36 EDT

## 2024-09-06 ENCOUNTER — OFFICE VISIT (OUTPATIENT)
Dept: INTERNAL MEDICINE | Age: 69
End: 2024-09-06
Payer: MEDICARE

## 2024-09-06 VITALS
SYSTOLIC BLOOD PRESSURE: 126 MMHG | HEART RATE: 72 BPM | WEIGHT: 179 LBS | DIASTOLIC BLOOD PRESSURE: 72 MMHG | BODY MASS INDEX: 32.94 KG/M2 | RESPIRATION RATE: 18 BRPM | HEIGHT: 62 IN | OXYGEN SATURATION: 98 % | TEMPERATURE: 95.5 F

## 2024-09-06 DIAGNOSIS — H61.23 BILATERAL IMPACTED CERUMEN: Primary | ICD-10-CM

## 2024-09-06 PROCEDURE — 99213 OFFICE O/P EST LOW 20 MIN: CPT | Performed by: PHYSICIAN ASSISTANT

## 2024-09-06 PROCEDURE — 1126F AMNT PAIN NOTED NONE PRSNT: CPT | Performed by: PHYSICIAN ASSISTANT

## 2024-09-06 NOTE — PROGRESS NOTES
"    I N T E R N A L  M E D I C I N E    STEFF PHELAN PA-C      ENCOUNTER DATE:  09/06/2024    Kim Rodriguezland / 69 y.o. / female    CHIEF COMPLAINT / REASON FOR OFFICE VISIT     Tinnitus (Both ears; stopped up; left ear has gotten worsen; going on gradually pt states dont know how long; no eardrops; pt states she is bout to go on vacation and she might have to wear ear plug )      ASSESSMENT & PLAN     1. Bilateral impacted cerumen      No orders of the defined types were placed in this encounter.    No orders of the defined types were placed in this encounter.      SUMMARY/DISCUSSION  Warm water irrigation performed to bilateral ears, after soaking with peroxide, causing disimpaction of cerumen.  Patient tolerated procedures well without complaint.  Counseled to schedule periodic follow-up cleanings with our office.      Next Appointment with me:   Return if symptoms worsen or fail to improve. And, for Follow-up with Jono Eng, OSMEL, APRN in November 2024..        VITAL SIGNS     Vitals:    09/06/24 0955   BP: 126/72   BP Location: Left arm   Patient Position: Sitting   Cuff Size: Adult   Pulse: 72   Resp: 18   Temp: 95.5 °F (35.3 °C)   TempSrc: Temporal   SpO2: 98%   Weight: 81.2 kg (179 lb)   Height: 157.5 cm (62.01\")       BP Readings from Last 3 Encounters:   09/06/24 126/72   05/22/24 122/76   10/25/23 122/70       Wt Readings from Last 3 Encounters:   09/06/24 81.2 kg (179 lb)   05/22/24 78.7 kg (173 lb 6.4 oz)   10/25/23 79.9 kg (176 lb 3.2 oz)     Body mass index is 32.73 kg/m².         No data to display                 MEDICATIONS AT THE TIME OF OFFICE VISIT     Current Outpatient Medications on File Prior to Visit   Medication Sig    amoxicillin (AMOXIL) 500 MG capsule Take 1 capsule by mouth 2 (Two) Times a Day.    aspirin 81 MG EC tablet Take 1 tablet by mouth Daily.    Cholecalciferol 25 MCG (1000 UT) tablet Take 2 tablets by mouth Daily.    ezetimibe (Zetia) 10 MG tablet Take 1 tablet by mouth " Daily.    gabapentin (NEURONTIN) 300 MG capsule TAKE 1 CAPSULE BY MOUTH EVERY MORNING AND TAKE TWO CAPSULES BY MOUTH EVERY EVENING    MULTIPLE VITAMINS-MINERALS PO Take 1 tablet by mouth Daily.    rosuvastatin (CRESTOR) 40 MG tablet Take 1 tablet by mouth Every Night.    traZODone (DESYREL) 50 MG tablet Take 1 tablet by mouth Every Night.    trimethoprim-polymyxin b (Polytrim) 14714-6.1 UNIT/ML-% ophthalmic solution Apply 1 drop to eye(s) as directed by provider Every 4 (Four) Hours.    vitamin D (ERGOCALCIFEROL) 1.25 MG (72798 UT) capsule capsule Take 1 capsule by mouth 1 (One) Time Per Week.     No current facility-administered medications on file prior to visit.          HISTORY OF PRESENT ILLNESS     Pt is a 70y/o wf with hyperlipidemia, who presents with complaint of bilateral ear congestion.     She states that on about two days ago she began to experience symptoms of bilateral ear congestion and reduced hearing to both ears. Left ear seems to be more congested than the right. She further admits some mild ringing in the left ear. Denies ear pain, vertigo, medication changes, diet changes, recent trauma, or recent illnesses.      She is concerned due to [place] through some mountainous areas. She is concerned that being in the mountains and may worsen the pressure in her ears.       REVIEW OF SYSTEMS     Review of Systems   All other systems reviewed and are negative.     As per HPI      PHYSICAL EXAMINATION     Physical Exam  Vitals and nursing note reviewed.   Constitutional:       General: She is not in acute distress.     Appearance: Normal appearance. She is not ill-appearing.   HENT:      Right Ear: Tympanic membrane, ear canal and external ear normal. There is impacted cerumen (>60% cerumen block).      Left Ear: Tympanic membrane, ear canal and external ear normal. There is impacted cerumen (< 40% Cerumen blockage).   Cardiovascular:      Rate  and Rhythm: Normal rate and regular rhythm.      Pulses: Normal pulses.      Heart sounds: Normal heart sounds.   Pulmonary:      Effort: Pulmonary effort is normal.      Breath sounds: Normal breath sounds.   Neurological:      Mental Status: She is alert.             REVIEWED DATA     Labs:              Imaging:           Medical Tests:           Summary of old records / correspondence / consultant report:           Request outside records:

## 2024-10-15 DIAGNOSIS — M54.41 ACUTE MIDLINE LOW BACK PAIN WITH BILATERAL SCIATICA: ICD-10-CM

## 2024-10-15 DIAGNOSIS — M54.42 ACUTE MIDLINE LOW BACK PAIN WITH BILATERAL SCIATICA: ICD-10-CM

## 2024-10-15 RX ORDER — GABAPENTIN 300 MG/1
CAPSULE ORAL
Qty: 90 CAPSULE | Refills: 0 | Status: SHIPPED | OUTPATIENT
Start: 2024-10-15

## 2024-11-19 DIAGNOSIS — E78.5 HYPERLIPIDEMIA, UNSPECIFIED HYPERLIPIDEMIA TYPE: ICD-10-CM

## 2024-11-19 RX ORDER — EZETIMIBE 10 MG/1
10 TABLET ORAL DAILY
Qty: 90 TABLET | Refills: 3 | Status: SHIPPED | OUTPATIENT
Start: 2024-11-19

## 2024-11-25 ENCOUNTER — OFFICE VISIT (OUTPATIENT)
Dept: INTERNAL MEDICINE | Age: 69
End: 2024-11-25
Payer: MEDICARE

## 2024-11-25 ENCOUNTER — TELEPHONE (OUTPATIENT)
Dept: INTERNAL MEDICINE | Age: 69
End: 2024-11-25

## 2024-11-25 VITALS
BODY MASS INDEX: 32.42 KG/M2 | OXYGEN SATURATION: 96 % | TEMPERATURE: 98.2 F | SYSTOLIC BLOOD PRESSURE: 104 MMHG | WEIGHT: 176.2 LBS | HEIGHT: 62 IN | DIASTOLIC BLOOD PRESSURE: 70 MMHG | HEART RATE: 83 BPM

## 2024-11-25 DIAGNOSIS — M54.41 ACUTE MIDLINE LOW BACK PAIN WITH BILATERAL SCIATICA: ICD-10-CM

## 2024-11-25 DIAGNOSIS — E78.5 HYPERLIPIDEMIA, UNSPECIFIED HYPERLIPIDEMIA TYPE: Primary | ICD-10-CM

## 2024-11-25 DIAGNOSIS — R73.03 PREDIABETES: ICD-10-CM

## 2024-11-25 DIAGNOSIS — M54.42 ACUTE MIDLINE LOW BACK PAIN WITH BILATERAL SCIATICA: ICD-10-CM

## 2024-11-25 DIAGNOSIS — Z79.899 HIGH RISK MEDICATION USE: ICD-10-CM

## 2024-11-25 DIAGNOSIS — R73.01 IMPAIRED FASTING GLUCOSE: ICD-10-CM

## 2024-11-25 DIAGNOSIS — G47.00 INSOMNIA, UNSPECIFIED TYPE: ICD-10-CM

## 2024-11-25 DIAGNOSIS — M47.816 LUMBAR SPONDYLOSIS: ICD-10-CM

## 2024-11-25 PROCEDURE — 99214 OFFICE O/P EST MOD 30 MIN: CPT | Performed by: NURSE PRACTITIONER

## 2024-11-25 PROCEDURE — 1126F AMNT PAIN NOTED NONE PRSNT: CPT | Performed by: NURSE PRACTITIONER

## 2024-11-25 RX ORDER — GABAPENTIN 300 MG/1
CAPSULE ORAL
Qty: 270 CAPSULE | Refills: 0 | Status: SHIPPED | OUTPATIENT
Start: 2024-11-25

## 2024-11-25 NOTE — PROGRESS NOTES
"    I N T E R N A L  M E D I C I N E  JACK DE LA TORRE, APRN      ENCOUNTER DATE:  11/25/2024    Kim Rodriguezland / 69 y.o. / female      CHIEF COMPLAINT / REASON FOR OFFICE VISIT     Prediabetes, Vitamin D Deficiency, Insomnia, Hyperlipidemia, and lumbar pain      ASSESSMENT & PLAN     Problem List Items Addressed This Visit       Hyperlipidemia - Primary    Relevant Medications    rosuvastatin (CRESTOR) 40 MG tablet    ezetimibe (ZETIA) 10 MG tablet    Impaired fasting glucose    Lumbar spondylosis    Overview     Added automatically from request for surgery [id number]         Low back pain    Relevant Medications    gabapentin (NEURONTIN) 300 MG capsule    Insomnia     No orders of the defined types were placed in this encounter.    New Medications Ordered This Visit   Medications    gabapentin (NEURONTIN) 300 MG capsule     Sig: TAKE 1 CAPSULE BY MOUTH EVERY MORNING AND TAKE TWO CAPSULES BY MOUTH EVERY EVENING     Dispense:  270 capsule     Refill:  0     SUMMARY/DISCUSSION  Insomnia fairly controlled with trazodone.  Drug screen ordered, [employer] reviewed and drug compliance contract up-to-date until February next year.  Will reschedule DEXA scan and mammogram.    Next Appointment with me: Visit date not found    No follow-ups on file.      VITAL SIGNS     Visit Vitals  /70 (BP Location: Left arm)   Pulse 83   Temp 98.2 °F (36.8 °C) (Oral)   Ht 157.5 cm (62.01\")   Wt 79.9 kg (176 lb 3.2 oz)   SpO2 96%   BMI 32.22 kg/m²       Wt Readings from Last 3 Encounters:   11/25/24 79.9 kg (176 lb 3.2 oz)   09/06/24 81.2 kg (179 lb)   05/22/24 78.7 kg (173 lb 6.4 oz)     Body mass index is 32.22 kg/m².      MEDICATIONS AT THE TIME OF OFFICE VISIT     Current Outpatient Medications on File Prior to Visit   Medication Sig    amoxicillin (AMOXIL) 500 MG capsule Take 1 capsule by mouth 2 (Two) Times a Day.    aspirin 81 MG EC tablet Take 1 tablet by mouth Daily.    Cholecalciferol 25 MCG (1000 UT) tablet Take 2 tablets by mouth " Daily.    ezetimibe (ZETIA) 10 MG tablet TAKE 1 TABLET BY MOUTH DAILY    MULTIPLE VITAMINS-MINERALS PO Take 1 tablet by mouth Daily.    rosuvastatin (CRESTOR) 40 MG tablet Take 1 tablet by mouth Every Night.    traZODone (DESYREL) 50 MG tablet Take 1 tablet by mouth Every Night.    trimethoprim-polymyxin b (Polytrim) 66151-9.1 UNIT/ML-% ophthalmic solution Apply 1 drop to eye(s) as directed by provider Every 4 (Four) Hours.    vitamin D (ERGOCALCIFEROL) 1.25 MG (22974 UT) capsule capsule Take 1 capsule by mouth 1 (One) Time Per Week.    [DISCONTINUED] gabapentin (NEURONTIN) 300 MG capsule TAKE 1 CAPSULE BY MOUTH EVERY MORNING AND TAKE TWO CAPSULES BY MOUTH EVERY EVENING     No current facility-administered medications on file prior to visit.          HISTORY OF PRESENT ILLNESS     Eye exam June 21, 2024.  Glaucoma suspect and cataract.    History of acute deep vein thrombosis DVT of axillary vein of right upper extremity last seen on December 7, 2021.  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  At that time she was referred to vascular surgery.  Has now been taken off of Eliquis.  Has restarted her baby aspirin.     Hyperlipidemia: Currently on crestor 40mg daily.  Last LDL of 81, triglycerides 147, total cholesterol 171.  On rosuvastatin 40 mg.  Reinitiated Zetia.      Impaired fasting glucose: Last hemoglobin A1c 6.1 in prediabetic range.  No medication treatment at this time.     Vitamin D level 23.7 on 5000 IU daily OTC, at last office visit was switched to 50,000 units to prescription supplement on a weekly basis.  Last vitamin D level controlled at 49.3.     Lumbar radiculopathy: Has been on gabapentin 300 mg every 8 hours.  Last MRI of the lumbar and thoracic spine completed in June and July 2021.  MRI showed bulging disc at L2-L3 along with severe  stenosis at L3-L4 with bulging disc.  Thoracic spine showed evidence of spinal cord compression versus artifact. S/P lumbar fusion with significant improvement in symptoms now controlled with continued gabapentin.      Mammogram benign 11/2022. DEXA osteopenia 05/2022. Cologuard neg 04/23/2022. No longer receiving pap smears, but family history of ovarian cancer, in [facility] study yearly for screening, has postponed due to scheduling.  She was scheduled in October for mammogram and DEXA scan this had been canceled.     Insomnia controlled with trazodone 50 mg nightly.     Bronchoscopy performed August 8, 2023 with positive finding of saprophytic mold isolated.  Planned additional biopsy.  Now has routine follow-up with pulmonology in which she had updated CT of the chest scheduled for Deepali fourth 2024.  There is an ill-defined density medial right lower lobe that had resolved.  Some groundglass densities in the left lower lobe have also resolved.     Diagnosis of rosacea is now following dermatology.    REVIEW OF SYSTEMS     Constitutional neg except per HPI   Resp neg  CV neg   Musc chronic back pain      PHYSICAL EXAMINATION     Physical Exam  Constitutional  No distress  Cardiovascular Rate  normal . Rhythm: regular . Heart sounds:  normal  Pulmonary/Chest  Effort normal. Breath sounds:  normal  Psychiatric  Alert. Judgment and thought content normal. Mood normal        REVIEWED DATA     Labs:   Lab Results   Component Value Date    GLUCOSE 92 05/22/2024    BUN 15 05/22/2024    CREATININE 0.94 05/22/2024    EGFRRESULT 66 05/22/2024    EGFR 64.2 05/27/2022    BCR 16 05/22/2024    K 4.8 05/22/2024    CO2 20 05/22/2024    CALCIUM 9.6 05/22/2024    PROTENTOTREF 7.1 05/22/2024    ALBUMIN 4.6 05/22/2024    BILITOT 0.3 05/22/2024    AST 20 05/22/2024    ALT 16 05/22/2024     Lab Results   Component Value Date    WBC 6.6 05/22/2024    HGB 14.2 05/22/2024    HCT 43.0 05/22/2024    MCV 92 05/22/2024     05/22/2024     Lab  Results   Component Value Date    CHLPL 171 05/22/2024    TRIG 147 05/22/2024    HDL 65 05/22/2024    LDL 81 05/22/2024      Lab Results   Component Value Date    TSH 1.170 04/12/2022     Brief Urine Lab Results       None          Lab Results   Component Value Date    HGBA1C 6.1 (H) 05/22/2024       Imaging:           Medical Tests:           Summary of old records / correspondence / consultant report:           Request outside records:

## 2024-11-25 NOTE — TELEPHONE ENCOUNTER
Caller: Kim Nava    Relationship: Self    Best call back number: [phone]     What is the best time to reach you: ANYTIME    Who are you requesting to speak with (clinical staff, provider,  specific staff member): CLINICAL     What was the call regarding: PATIENT CALLING WANTING TO KNOW IF SHE NEEDS TO FAST FOR HER APPOINTMENT AT 2:00PM TODAY.    Is it okay if the provider responds through MyChart: NO

## 2024-12-04 LAB
11OH-THC SPEC-MCNC: NEGATIVE NG/ML
ALBUMIN SERPL-MCNC: 4.7 G/DL (ref 3.9–4.9)
ALP SERPL-CCNC: 70 IU/L (ref 44–121)
ALT SERPL-CCNC: 18 IU/L (ref 0–32)
AMPHETAMINES SERPL QL SCN: NEGATIVE NG/ML
AST SERPL-CCNC: 23 IU/L (ref 0–40)
BARBITURATES SERPL QL SCN: NEGATIVE UG/ML
BENZODIAZ SERPL QL SCN: NEGATIVE NG/ML
BILIRUB SERPL-MCNC: 0.4 MG/DL (ref 0–1.2)
BUN SERPL-MCNC: 15 MG/DL (ref 8–27)
BUN/CREAT SERPL: 17 (ref 12–28)
CALCIUM SERPL-MCNC: 9.4 MG/DL (ref 8.7–10.3)
CANNABIDIOL SERPLBLD CFM-MCNC: 1 NG/ML
CANNABINOIDS SERPL QL SCN: ABNORMAL NG/ML
CANNABINOIDS SPEC QL CFM: POSITIVE
CARBOXYTHC SPEC-MCNC: 10.1 NG/ML
CHLORIDE SERPL-SCNC: 106 MMOL/L (ref 96–106)
CHOLEST SERPL-MCNC: 176 MG/DL (ref 100–199)
CHOLEST/HDLC SERPL: 2.6 RATIO (ref 0–4.4)
CO2 SERPL-SCNC: 20 MMOL/L (ref 20–29)
COCAINE+BZE SERPL QL SCN: NEGATIVE NG/ML
CREAT SERPL-MCNC: 0.89 MG/DL (ref 0.57–1)
EGFRCR SERPLBLD CKD-EPI 2021: 70 ML/MIN/1.73
ETHANOL SERPL-MCNC: NEGATIVE GM/DL
GLOBULIN SER CALC-MCNC: 2.7 G/DL (ref 1.5–4.5)
GLUCOSE SERPL-MCNC: 98 MG/DL (ref 70–99)
HBA1C MFR BLD: 6 % (ref 4.8–5.6)
HDLC SERPL-MCNC: 69 MG/DL
LDLC SERPL CALC-MCNC: 85 MG/DL (ref 0–99)
METHADONE SERPL QL SCN: NEGATIVE NG/ML
OPIATES SERPL QL SCN: NEGATIVE NG/ML
OXYCODONE+OXYMORPHONE SERPLBLD QL SCN: NEGATIVE NG/ML
PCP SERPL QL SCN: NEGATIVE NG/ML
POTASSIUM SERPL-SCNC: 4.4 MMOL/L (ref 3.5–5.2)
PROPOXYPH SERPL QL SCN: NEGATIVE NG/ML
PROT SERPL-MCNC: 7.4 G/DL (ref 6–8.5)
SODIUM SERPL-SCNC: 139 MMOL/L (ref 134–144)
THC SERPLBLD CFM-MCNC: NEGATIVE NG/ML
TRIGL SERPL-MCNC: 126 MG/DL (ref 0–149)
VLDLC SERPL CALC-MCNC: 22 MG/DL (ref 5–40)

## 2024-12-23 RX ORDER — ROSUVASTATIN CALCIUM 40 MG/1
40 TABLET, COATED ORAL NIGHTLY
Qty: 90 TABLET | Refills: 0 | Status: SHIPPED | OUTPATIENT
Start: 2024-12-23

## 2025-02-27 DIAGNOSIS — M54.41 ACUTE MIDLINE LOW BACK PAIN WITH BILATERAL SCIATICA: ICD-10-CM

## 2025-02-27 DIAGNOSIS — M54.42 ACUTE MIDLINE LOW BACK PAIN WITH BILATERAL SCIATICA: ICD-10-CM

## 2025-02-27 RX ORDER — GABAPENTIN 300 MG/1
CAPSULE ORAL
Qty: 270 CAPSULE | Refills: 0 | Status: SHIPPED | OUTPATIENT
Start: 2025-02-27

## 2025-04-02 RX ORDER — ROSUVASTATIN CALCIUM 40 MG/1
40 TABLET, COATED ORAL NIGHTLY
Qty: 90 TABLET | Refills: 0 | Status: SHIPPED | OUTPATIENT
Start: 2025-04-02

## 2025-04-02 NOTE — TELEPHONE ENCOUNTER
Caller: Elijah Kim ROB    Relationship: Self    Best call back number: [phone]    Requested Prescriptions:   Requested Prescriptions     Pending Prescriptions Disp Refills    rosuvastatin (CRESTOR) 40 MG tablet 90 tablet 0     Sig: Take 1 tablet by mouth Every Night.        Pharmacy where request should be sent: [facility] [id number] [place] [id number]  AT [address]  - [phone] [facility] [phone] FX     Last office visit with prescribing clinician: 11/25/2024   Last telemedicine visit with prescribing clinician: Visit date not found   Next office visit with prescribing clinician: 5/23/2025     Additional details provided by patient:   PATIENT IS OUT OF MEDICATION.     WON'T HAVE ANY FOR TONIGHT'S DOSAGE.    Does the patient have less than a 3 day supply:  [x] Yes  [] No    Would you like a call back once the refill request has been completed: [x] Yes [] No    If the office needs to give you a call back, can they leave a voicemail: [x] Yes [] No    Neva Avalos Rep   04/02/25 12:25 EDT

## 2025-05-23 ENCOUNTER — OFFICE VISIT (OUTPATIENT)
Dept: INTERNAL MEDICINE | Age: 70
End: 2025-05-23
Payer: MEDICARE

## 2025-05-23 ENCOUNTER — HOSPITAL ENCOUNTER (OUTPATIENT)
Facility: HOSPITAL | Age: 70
Discharge: HOME | End: 2025-05-23
Payer: MEDICARE

## 2025-05-23 VITALS
HEART RATE: 64 BPM | BODY MASS INDEX: 32.72 KG/M2 | SYSTOLIC BLOOD PRESSURE: 134 MMHG | TEMPERATURE: 96.7 F | WEIGHT: 177.8 LBS | OXYGEN SATURATION: 97 % | HEIGHT: 62 IN | DIASTOLIC BLOOD PRESSURE: 84 MMHG

## 2025-05-23 DIAGNOSIS — M48.062 SPINAL STENOSIS OF LUMBAR REGION WITH NEUROGENIC CLAUDICATION: ICD-10-CM

## 2025-05-23 DIAGNOSIS — E55.9 VITAMIN D DEFICIENCY: ICD-10-CM

## 2025-05-23 DIAGNOSIS — R73.01 IMPAIRED FASTING GLUCOSE: ICD-10-CM

## 2025-05-23 DIAGNOSIS — M54.50 LOW BACK PAIN WITHOUT SCIATICA, UNSPECIFIED BACK PAIN LATERALITY, UNSPECIFIED CHRONICITY: ICD-10-CM

## 2025-05-23 DIAGNOSIS — G47.00 INSOMNIA, UNSPECIFIED TYPE: ICD-10-CM
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
## 2025-05-23 DIAGNOSIS — E78.5 HYPERLIPIDEMIA, UNSPECIFIED HYPERLIPIDEMIA TYPE: Primary | ICD-10-CM

## 2025-05-23 DIAGNOSIS — Z79.899 HIGH RISK MEDICATION USE: ICD-10-CM

## 2025-05-23 DIAGNOSIS — Z12.11 SCREEN FOR COLON CANCER: ICD-10-CM

## 2025-05-23 PROCEDURE — 72110 X-RAY EXAM L-2 SPINE 4/>VWS: CPT

## 2025-05-23 NOTE — PROGRESS NOTES
"    I N T E R N A L  M E D I C I N E  JACK DE LA TORRE, APRN      ENCOUNTER DATE:  05/23/2025    Kim Nava / 69 y.o. / female      CHIEF COMPLAINT / REASON FOR OFFICE VISIT     follow up (From last visit)      ASSESSMENT & PLAN     Problem List Items Addressed This Visit       Hyperlipidemia - Primary    Relevant Medications    ezetimibe (ZETIA) 10 MG tablet    rosuvastatin (CRESTOR) 40 MG tablet    Impaired fasting glucose    Low back pain    Relevant Medications    gabapentin (NEURONTIN) 300 MG capsule    Spinal stenosis of lumbar region with neurogenic claudication    Relevant Orders    XR Spine Lumbar 4+ View    Insomnia     Other Visit Diagnoses         Fall, initial encounter        Relevant Orders    XR Spine Lumbar 4+ View      Screen for colon cancer        Relevant Orders    Cologuard - Stool, Per Rectum          Orders Placed This Encounter   Procedures    XR Spine Lumbar 4+ View    Cologuard - Stool, Per Rectum     No orders of the defined types were placed in this encounter.      SUMMARY/DISCUSSION  X-ray ordered to rule out compression fracture.  [place] reviewed and controlled substance contract signed for gabapentin.  Cologuard for colon cancer screening, declines mammogram or DEXA scan at this time.  Continue current regimen for vitamin D and hyperlipidemia  Follow-up in 6 months for chronic medical, earlier if needed.    Next Appointment with me: 5/29/2025    No follow-ups on file.      VITAL SIGNS     Visit Vitals  /84   Pulse 64   Temp 96.7 °F (35.9 °C)   Ht 157.5 cm (62.01\")   Wt 80.6 kg (177 lb 12.8 oz)   SpO2 97%   BMI 32.51 kg/m²       Wt Readings from Last 3 Encounters:   05/23/25 80.6 kg (177 lb 12.8 oz)   11/25/24 79.9 kg (176 lb 3.2 oz)   09/06/24 81.2 kg (179 lb)     Body mass index is 32.51 kg/m².      MEDICATIONS AT THE TIME OF OFFICE VISIT     Current Outpatient Medications on File Prior to Visit   Medication Sig    aspirin 81 MG EC tablet Take 1 tablet by mouth Daily.    " Cholecalciferol 25 MCG (1000 UT) tablet Take 2 tablets by mouth Daily.    ezetimibe (ZETIA) 10 MG tablet TAKE 1 TABLET BY MOUTH DAILY    gabapentin (NEURONTIN) 300 MG capsule TAKE 1 CAPSULE BY MOUTH EVERY MORNING AND TAKE TWO CAPSULES BY MOUTH EVERY EVENING    MULTIPLE VITAMINS-MINERALS PO Take 1 tablet by mouth Daily.    rosuvastatin (CRESTOR) 40 MG tablet Take 1 tablet by mouth Every Night.    traZODone (DESYREL) 50 MG tablet Take 1 tablet by mouth Every Night.    trimethoprim-polymyxin b (Polytrim) 37488-3.1 UNIT/ML-% ophthalmic solution Apply 1 drop to eye(s) as directed by provider Every 4 (Four) Hours.    vitamin D (ERGOCALCIFEROL) 1.25 MG (82900 UT) capsule capsule Take 1 capsule by mouth 1 (One) Time Per Week.    [DISCONTINUED] amoxicillin (AMOXIL) 500 MG capsule Take 1 capsule by mouth 2 (Two) Times a Day. (Patient not taking: Reported on 5/23/2025)     No current facility-administered medications on file prior to visit.          HISTORY OF PRESENT ILLNESS     Eye exam June 21, 2024.  Glaucoma suspect and cataract.     History of acute deep vein thrombosis DVT of axillary vein of right upper extremity last seen on December 7, 2021.  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  At that time she was referred to vascular surgery.  Has now been taken off of Eliquis.  Has restarted her baby aspirin.     Hyperlipidemia: Currently on crestor 40mg daily.  Last LDL of 85, triglycerides 126, total cholesterol 176.  On rosuvastatin 40 mg.  Reinitiated Zetia.     Impaired fasting glucose: Last hemoglobin A1c 6.0 in prediabetic range.  No medication treatment at this time.    Vitamin D level 49.3 on 5000 IU daily OTC, at last office visit was switched to 50,000 units to prescription supplement on a weekly basis.  Last vitamin D level controlled at 49.3.      Lumbar radiculopathy: Has been on gabapentin 300 mg every 8 hours.  Last MRI of the lumbar and thoracic spine completed in June and July 2021.  MRI showed bulging disc at L2-L3 along with severe stenosis at L3-L4 with bulging disc.  Thoracic spine showed evidence of spinal cord compression versus artifact. S/P lumbar fusion with significant improvement in symptoms now controlled with continued gabapentin.  Recent fall, lumbar pain has been increased.  She has a follow-up with neurosurgery on June 7 who would like to rule out compression fracture.     Mammogram benign 11/2022. DEXA osteopenia 05/2022. Cologuard neg 04/23/2022. No longer receiving pap smears, but family history of ovarian cancer, in [facility] study yearly for screening, has postponed due to scheduling.  She was scheduled in October for mammogram and DEXA scan this had been canceled.  Declines updating mammogram or DEXA scan at this time.     Insomnia controlled with trazodone 50 mg nightly.     Bronchoscopy performed August 8, 2023 with positive finding of saprophytic mold isolated.  Planned additional biopsy.  Now has routine follow-up with pulmonology in which she had updated CT of the chest scheduled for Deepali fourth 2024.  There is an ill-defined density medial right lower lobe that had resolved.  Some groundglass densities in the left lower lobe have also resolved.  No longer following.     Diagnosis of rosacea is now following dermatology.      REVIEW OF SYSTEMS     Constitutional neg except per HPI   Resp neg  CV neg   Musc lumbar pain    PHYSICAL EXAMINATION     Physical Exam  Constitutional  No distress  Cardiovascular Rate  normal . Rhythm: regular . Heart sounds:  normal  Pulmonary/Chest  Effort normal. Breath sounds:  normal  Musc lumbar tenderness  Psychiatric  Alert. Judgment and thought content normal. Mood normal      REVIEWED DATA     Labs:   Lab Results   Component Value Date    GLUCOSE 98 11/25/2024    BUN 15 11/25/2024    CREATININE 0.89  11/25/2024    EGFR 70 11/25/2024    BCR 17 11/25/2024    K 4.4 11/25/2024    CO2 20 11/25/2024    CALCIUM 9.4 11/25/2024    ALBUMIN 4.7 11/25/2024    BILITOT 0.4 11/25/2024    AST 23 11/25/2024    ALT 18 11/25/2024     Lab Results   Component Value Date    WBC 6.6 05/22/2024    HGB 14.2 05/22/2024    HCT 43.0 05/22/2024    MCV 92 05/22/2024     05/22/2024     Lab Results   Component Value Date    CHLPL 176 11/25/2024    TRIG 126 11/25/2024    HDL 69 11/25/2024    LDL 85 11/25/2024      Lab Results   Component Value Date    TSH 1.170 04/12/2022     Brief Urine Lab Results       None          Lab Results   Component Value Date    HGBA1C 6.0 (H) 11/25/2024       Imaging:           Medical Tests:           Summary of old records / correspondence / consultant report:           Request outside records:

## 2025-05-30 DIAGNOSIS — M54.42 ACUTE MIDLINE LOW BACK PAIN WITH BILATERAL SCIATICA: ICD-10-CM

## 2025-05-30 DIAGNOSIS — M54.41 ACUTE MIDLINE LOW BACK PAIN WITH BILATERAL SCIATICA: ICD-10-CM

## 2025-05-30 RX ORDER — GABAPENTIN 300 MG/1
CAPSULE ORAL
Qty: 270 CAPSULE | Refills: 0 | Status: SHIPPED | OUTPATIENT
Start: 2025-05-30

## 2025-05-30 NOTE — TELEPHONE ENCOUNTER
[employer] - 05/23/2025  Controlled agreement - 05/23/2025  Last visit - 05/23/2025  Next visit - 11/21/2025

## 2025-06-04 LAB
11OH-THC SPEC-MCNC: NEGATIVE NG/ML
25(OH)D3+25(OH)D2 SERPL-MCNC: 43.6 NG/ML (ref 30–100)
ALBUMIN SERPL-MCNC: 4.6 G/DL (ref 3.9–4.9)
ALP SERPL-CCNC: 73 IU/L (ref 44–121)
ALT SERPL-CCNC: 18 IU/L (ref 0–32)
AMPHETAMINES SERPL QL SCN: NEGATIVE NG/ML
AST SERPL-CCNC: 24 IU/L (ref 0–40)
BARBITURATES SERPL QL SCN: NEGATIVE UG/ML
BASOPHILS # BLD AUTO: 0.1 X10E3/UL (ref 0–0.2)
BASOPHILS NFR BLD AUTO: 1 %
BENZODIAZ SERPL QL SCN: NEGATIVE NG/ML
BILIRUB SERPL-MCNC: 0.4 MG/DL (ref 0–1.2)
BUN SERPL-MCNC: 18 MG/DL (ref 8–27)
BUN/CREAT SERPL: 19 (ref 12–28)
CALCIUM SERPL-MCNC: 10.4 MG/DL (ref 8.7–10.3)
CANNABIDIOL SERPLBLD CFM-MCNC: 3.6 NG/ML
CANNABINOIDS SERPL QL SCN: ABNORMAL NG/ML
CANNABINOIDS SPEC QL CFM: POSITIVE
CARBOXYTHC SPEC-MCNC: NORMAL NG/ML
CHLORIDE SERPL-SCNC: 103 MMOL/L (ref 96–106)
CHOLEST SERPL-MCNC: 186 MG/DL (ref 100–199)
CHOLEST/HDLC SERPL: 2.5 RATIO (ref 0–4.4)
CO2 SERPL-SCNC: 23 MMOL/L (ref 20–29)
COCAINE+BZE SERPL QL SCN: NEGATIVE NG/ML
CREAT SERPL-MCNC: 0.94 MG/DL (ref 0.57–1)
EGFRCR SERPLBLD CKD-EPI 2021: 65 ML/MIN/1.73
EOSINOPHIL # BLD AUTO: 0.3 X10E3/UL (ref 0–0.4)
EOSINOPHIL NFR BLD AUTO: 5 %
ERYTHROCYTE [DISTWIDTH] IN BLOOD BY AUTOMATED COUNT: 12.6 % (ref 11.7–15.4)
ETHANOL SERPL-MCNC: NEGATIVE GM/DL
GLOBULIN SER CALC-MCNC: 2.6 G/DL (ref 1.5–4.5)
GLUCOSE SERPL-MCNC: 98 MG/DL (ref 70–99)
HBA1C MFR BLD: 6.1 % (ref 4.8–5.6)
HCT VFR BLD AUTO: 42.5 % (ref 34–46.6)
HDLC SERPL-MCNC: 74 MG/DL
HGB BLD-MCNC: 13.7 G/DL (ref 11.1–15.9)
IMM GRANULOCYTES # BLD AUTO: 0 X10E3/UL (ref 0–0.1)
IMM GRANULOCYTES NFR BLD AUTO: 0 %
LDLC SERPL CALC-MCNC: 91 MG/DL (ref 0–99)
LYMPHOCYTES # BLD AUTO: 2.7 X10E3/UL (ref 0.7–3.1)
LYMPHOCYTES NFR BLD AUTO: 45 %
MCH RBC QN AUTO: 30.4 PG (ref 26.6–33)
MCHC RBC AUTO-ENTMCNC: 32.2 G/DL (ref 31.5–35.7)
MCV RBC AUTO: 94 FL (ref 79–97)
METHADONE SERPL QL SCN: NEGATIVE NG/ML
MONOCYTES # BLD AUTO: 0.6 X10E3/UL (ref 0.1–0.9)
MONOCYTES NFR BLD AUTO: 9 %
NEUTROPHILS # BLD AUTO: 2.4 X10E3/UL (ref 1.4–7)
NEUTROPHILS NFR BLD AUTO: 40 %
OPIATES SERPL QL SCN: NEGATIVE NG/ML
OXYCODONE+OXYMORPHONE SERPLBLD QL SCN: NEGATIVE NG/ML
PCP SERPL QL SCN: NEGATIVE NG/ML
PLATELET # BLD AUTO: 284 X10E3/UL (ref 150–450)
POTASSIUM SERPL-SCNC: 4.4 MMOL/L (ref 3.5–5.2)
PROPOXYPH SERPL QL SCN: NEGATIVE NG/ML
PROT SERPL-MCNC: 7.2 G/DL (ref 6–8.5)
RBC # BLD AUTO: 4.51 X10E6/UL (ref 3.77–5.28)
SODIUM SERPL-SCNC: 140 MMOL/L (ref 134–144)
T4 FREE SERPL-MCNC: 1.47 NG/DL (ref 0.82–1.77)
THC SERPLBLD CFM-MCNC: NEGATIVE NG/ML
TRIGL SERPL-MCNC: 123 MG/DL (ref 0–149)
TSH SERPL DL<=0.005 MIU/L-ACNC: 1.23 UIU/ML (ref 0.45–4.5)
VLDLC SERPL CALC-MCNC: 21 MG/DL (ref 5–40)
WBC # BLD AUTO: 6 X10E3/UL (ref 3.4–10.8)

## 2025-06-13 ENCOUNTER — OFFICE VISIT (OUTPATIENT)
Dept: INTERNAL MEDICINE | Age: 70
End: 2025-06-13
Payer: MEDICARE

## 2025-06-13 VITALS
DIASTOLIC BLOOD PRESSURE: 76 MMHG | BODY MASS INDEX: 32.9 KG/M2 | OXYGEN SATURATION: 98 % | TEMPERATURE: 97.7 F | WEIGHT: 178.8 LBS | SYSTOLIC BLOOD PRESSURE: 132 MMHG | HEART RATE: 78 BPM | HEIGHT: 62 IN

## 2025-06-13 DIAGNOSIS — J40 SINOBRONCHITIS: Primary | ICD-10-CM

## 2025-06-13 DIAGNOSIS — J32.9 SINOBRONCHITIS: Primary | ICD-10-CM

## 2025-06-13 RX ORDER — DOXYCYCLINE 100 MG/1
100 CAPSULE ORAL 2 TIMES DAILY
Qty: 14 CAPSULE | Refills: 0 | Status: SHIPPED | OUTPATIENT
Start: 2025-06-13 | End: 2025-06-20

## 2025-06-13 NOTE — PROGRESS NOTES
"    I N T E R N A L  M E D I C I N E  Katerine Stokes, APRN    ENCOUNTER DATE:  06/13/2025    Kim [place]land / 69 y.o. / female      CHIEF COMPLAINT / REASON FOR OFFICE VISIT     Sinus Problem, Cough, and Bronchitis (X 1 week)      ASSESSMENT & PLAN     Diagnoses and all orders for this visit:    1. Sinobronchitis (Primary)  -     doxycycline (VIBRAMYCIN) 100 MG capsule; Take 1 capsule by mouth 2 (Two) Times a Day for 7 days.  Dispense: 14 capsule; Refill: 0         SUMMARY/DISCUSSION  Hemodynamically stable.  Given persistent symptoms, will treat for bacterial infection with doxycycline, which she has tolerated well in the past.  Add Muxinex DM from OTC, ensure good hydration with water.  Follow up for non improving symptoms, ER for acutely worsening symptoms.  Follow up with PCP as scheduled.      Next Appointment with me: Visit date not found    Return for Next scheduled follow up.      VITAL SIGNS     Visit Vitals  /76   Pulse 78   Temp 97.7 °F (36.5 °C)   Ht 157.5 cm (62.01\")   Wt 81.1 kg (178 lb 12.8 oz)   SpO2 98%   BMI 32.69 kg/m²             Wt Readings from Last 3 Encounters:   06/13/25 81.1 kg (178 lb 12.8 oz)   05/23/25 80.6 kg (177 lb 12.8 oz)   11/25/24 79.9 kg (176 lb 3.2 oz)     Body mass index is 32.69 kg/m².        MEDICATIONS AT THE TIME OF OFFICE VISIT     Current Outpatient Medications on File Prior to Visit   Medication Sig Dispense Refill    aspirin 81 MG EC tablet Take 1 tablet by mouth Daily.      Cholecalciferol 25 MCG (1000 UT) tablet Take 2 tablets by mouth Daily.      ezetimibe (ZETIA) 10 MG tablet TAKE 1 TABLET BY MOUTH DAILY 90 tablet 3    gabapentin (NEURONTIN) 300 MG capsule TAKE 1 CAPSULE BY MOUTH EVERY MORNING AND TAKE 2 CAPSULES BY MOUTH EVERY EVENING 270 capsule 0    MULTIPLE VITAMINS-MINERALS PO Take 1 tablet by mouth Daily.      rosuvastatin (CRESTOR) 40 MG tablet Take 1 tablet by mouth Every Night. 90 tablet 0    traZODone (DESYREL) 50 MG tablet Take 1 tablet by mouth Every " Night. 90 tablet 1    trimethoprim-polymyxin b (Polytrim) 58438-0.1 UNIT/ML-% ophthalmic solution Apply 1 drop to eye(s) as directed by provider Every 4 (Four) Hours. 10 mL 0    vitamin D (ERGOCALCIFEROL) 1.25 MG (68208 UT) capsule capsule Take 1 capsule by mouth 1 (One) Time Per Week. 12 capsule 3     No current facility-administered medications on file prior to visit.        HISTORY OF PRESENT ILLNESS      recently ill with bronchitis.    Symptoms started this past Sunday with chest tightness, productive cough with green sputum, congestion, rhinorrhea.  Eating, drinking, urinating well.  No cardiac/ pulmonary history.  Has not tried anything over the counter.  No dyspnea, fever, chills, chest pain, sinus/ pressure pain.  Allergic to cefazolin, cephalosporins.  Has taken doxycyline in the past and tolerated well.  No recent antibiotic use.      Patient Care Team:  Jono Eng, OSMEL, APRN as PCP - General (Internal Medicine)  Noel Saenz MD as Surgeon (Orthopedic Surgery)  Maury eBrnard OD (Optometry)  Ellen Becker MD as Consulting Physician (Dermatology)    REVIEW OF SYSTEMS     Review of Systems   Constitutional:  Negative for chills, fever and unexpected weight change.   HENT:  Positive for congestion and rhinorrhea. Negative for sinus pressure and sinus pain.    Respiratory:  Positive for cough. Negative for chest tightness and shortness of breath.    Cardiovascular:  Negative for chest pain, palpitations and leg swelling.   Neurological:  Negative for dizziness, weakness, light-headedness and headaches.   Psychiatric/Behavioral:  The patient is not nervous/anxious.           PHYSICAL EXAMINATION     Physical Exam  Vitals reviewed.   Constitutional:       General: She is not in acute distress.     Appearance: Normal appearance. She is not ill-appearing, toxic-appearing or diaphoretic.   HENT:      Head: Normocephalic and atraumatic.      Right Ear: Tympanic membrane, ear canal and external  ear normal. There is no impacted cerumen.      Left Ear: Tympanic membrane, ear canal and external ear normal. There is no impacted cerumen.      Nose: Congestion present. No rhinorrhea.      Right Sinus: No maxillary sinus tenderness or frontal sinus tenderness.      Left Sinus: No maxillary sinus tenderness or frontal sinus tenderness.      Mouth/Throat:      Mouth: Mucous membranes are moist.      Pharynx: Oropharynx is clear. No oropharyngeal exudate or posterior oropharyngeal erythema.   Cardiovascular:      Rate and Rhythm: Normal rate and regular rhythm.      Heart sounds: Normal heart sounds.   Pulmonary:      Effort: Pulmonary effort is normal.      Breath sounds: Normal breath sounds. No wheezing, rhonchi or rales.   Lymphadenopathy:      Cervical: No cervical adenopathy.   Neurological:      Mental Status: She is alert and oriented to person, place, and time. Mental status is at baseline.   Psychiatric:         Mood and Affect: Mood normal.         Behavior: Behavior normal.         Thought Content: Thought content normal.         Judgment: Judgment normal.           REVIEWED DATA     Labs:           Imaging:            Medical Tests:           Summary of old records / correspondence / consultant report:           Request outside records:

## 2025-06-30 RX ORDER — ROSUVASTATIN CALCIUM 40 MG/1
40 TABLET, COATED ORAL NIGHTLY
Qty: 90 TABLET | Refills: 1 | Status: SHIPPED | OUTPATIENT
Start: 2025-06-30